# Patient Record
Sex: FEMALE | Race: WHITE | NOT HISPANIC OR LATINO | URBAN - METROPOLITAN AREA
[De-identification: names, ages, dates, MRNs, and addresses within clinical notes are randomized per-mention and may not be internally consistent; named-entity substitution may affect disease eponyms.]

---

## 2023-01-26 ENCOUNTER — EMERGENCY (EMERGENCY)
Facility: HOSPITAL | Age: 28
LOS: 1 days | Discharge: HOME | End: 2023-01-26
Admitting: EMERGENCY MEDICINE
Payer: COMMERCIAL

## 2023-01-26 VITALS
RESPIRATION RATE: 16 BRPM | HEIGHT: 66 IN | SYSTOLIC BLOOD PRESSURE: 110 MMHG | HEART RATE: 70 BPM | WEIGHT: 145.06 LBS | DIASTOLIC BLOOD PRESSURE: 78 MMHG | OXYGEN SATURATION: 97 % | TEMPERATURE: 98 F

## 2023-01-26 PROCEDURE — 99283 EMERGENCY DEPT VISIT LOW MDM: CPT

## 2023-01-26 PROCEDURE — 99284 EMERGENCY DEPT VISIT MOD MDM: CPT

## 2023-01-26 PROCEDURE — 96372 THER/PROPH/DIAG INJ SC/IM: CPT

## 2023-01-26 RX ORDER — CYCLOBENZAPRINE HYDROCHLORIDE 10 MG/1
1 TABLET, FILM COATED ORAL
Qty: 15 | Refills: 0
Start: 2023-01-26 | End: 2023-01-30

## 2023-01-26 RX ORDER — KETOROLAC TROMETHAMINE 30 MG/ML
30 SYRINGE (ML) INJECTION ONCE
Refills: 0 | Status: DISCONTINUED | OUTPATIENT
Start: 2023-01-26 | End: 2023-01-26

## 2023-01-26 RX ORDER — DICLOFENAC SODIUM 75 MG/1
1 TABLET, DELAYED RELEASE ORAL
Qty: 15 | Refills: 0
Start: 2023-01-26 | End: 2023-01-30

## 2023-01-26 RX ORDER — LIDOCAINE 4 G/100G
1 CREAM TOPICAL
Qty: 7 | Refills: 0
Start: 2023-01-26 | End: 2023-02-01

## 2023-01-26 RX ORDER — LIDOCAINE 4 G/100G
1 CREAM TOPICAL ONCE
Refills: 0 | Status: COMPLETED | OUTPATIENT
Start: 2023-01-26 | End: 2023-01-26

## 2023-01-26 RX ORDER — CYCLOBENZAPRINE HYDROCHLORIDE 10 MG/1
10 TABLET, FILM COATED ORAL ONCE
Refills: 0 | Status: COMPLETED | OUTPATIENT
Start: 2023-01-26 | End: 2023-01-26

## 2023-01-26 RX ADMIN — CYCLOBENZAPRINE HYDROCHLORIDE 10 MILLIGRAM(S): 10 TABLET, FILM COATED ORAL at 15:38

## 2023-01-26 RX ADMIN — Medication 30 MILLIGRAM(S): at 16:52

## 2023-01-26 RX ADMIN — Medication 30 MILLIGRAM(S): at 15:38

## 2023-01-26 RX ADMIN — LIDOCAINE 1 PATCH: 4 CREAM TOPICAL at 15:38

## 2023-01-26 NOTE — ED ADULT TRIAGE NOTE - CHIEF COMPLAINT QUOTE
Pt BIBEMS from home for evaluation of lower back pain that began 3 days ago. Pt unsure of injury. Pt states, "I did Jiujitsu the night before and then the next day my back started hurting." Denies saddle anesthesia, bowl or bladder dysfunction, urinary sx.

## 2023-01-26 NOTE — ED PROVIDER NOTE - NSFOLLOWUPINSTRUCTIONS_ED_ALL_ED_FT
Back Pain  rest, avoid heavy lifting, pushing, pulling, apply heating pad, take medications as discussed, follow up with your pmd  Back pain is very common in adults. The cause of back pain is rarely dangerous and the pain often gets better over time. The cause of your back pain may not be known and may include strain of muscles or ligaments, degeneration of the spinal disks, or arthritis. Occasionally the pain may radiate down your leg(s). Over-the-counter medicines to reduce pain and inflammation are often the most helpful. Stretching and remaining active frequently helps the healing process.     SEEK IMMEDIATE MEDICAL CARE IF YOU HAVE ANY OF THE FOLLOWING SYMPTOMS: bowel or bladder control problems, unusual weakness or numbness in your arms or legs, nausea or vomiting, abdominal pain, fever, dizziness/lightheadedness.

## 2023-01-26 NOTE — ED ADULT NURSE REASSESSMENT NOTE - NS ED NURSE REASSESS COMMENT FT1
Patient back pain improved s/p meds, no new symptom complaint, vital signs stable. Discharged to home in stable condition.

## 2023-01-26 NOTE — ED ADULT NURSE REASSESSMENT NOTE - NS ED NURSE REASSESS COMMENT FT1
Patient aoX3, anxious, c/o of lower back pain with difficulty and pain on movement, no numbness/tingling. Vital signs stable.  Adminsitered Lidocaine patch, Flexeril PO, Toradol 30mg IM.  Disposition pending.

## 2023-01-26 NOTE — ED PROVIDER NOTE - MUSCULOSKELETAL, MLM
no rash, no lesions, no spinal tend, FROM, + para lumbar tend b/l, LE w/FROM b/l, muscle strength 5/5 b/l, good resistance b/l, normal gait

## 2023-01-26 NOTE — ED PROVIDER NOTE - PATIENT PORTAL LINK FT
You can access the FollowMyHealth Patient Portal offered by [facility] by registering at the following website: [email or web address]. By joining [employer]’s FollowMyHealth portal, you will also be able to view your health information using other applications (apps) compatible with our system.

## 2023-01-26 NOTE — ED ADULT NURSE NOTE - OBJECTIVE STATEMENT
Patient c/o of lower mid-back pain, states did Jujitsu 3 days ago Monday and noted back pain started the next day, worse today , even getting up from chair causes severe pain, no numbness/tingling, no neck tenderness, no neuro or motor deficits.  States does not recall getting injured.  Took Ibuprofen last night, still with pain.

## 2023-01-26 NOTE — ED PROVIDER NOTE - CLINICAL SUMMARY MEDICAL DECISION MAKING FREE TEXT BOX
pt c/o lbp after doing [place], no blunt trauma or fall, no saddle anesthesia, no bowel or bladder incontinence, no concern for cord compression or cauda equina, no concern for fx, suspect to msk / spasm, improved w/meds in ed, will dc w/same regimen, to rest and f/u w/pmd or spine, pt understands and agrees w/plan, strict return precautions given

## 2023-01-26 NOTE — ED PROVIDER NOTE - OBJECTIVE STATEMENT
The pt is a 28 y/o F, who presents to ED c/o LBP today -- did sreedhar few d ago. Pt states pain is to entire lower back, 7/10, constant and dull, aggravated w/mov, has not taken any pain meds. Denies blunt trauma, fall, numbness or tingling to toes, loss of bowel or bladder control, fevers, chills.
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
## 2023-01-28 DIAGNOSIS — M54.50 LOW BACK PAIN, UNSPECIFIED: ICD-10-CM

## 2023-11-20 NOTE — ED ADULT NURSE NOTE - NS ED NURSE RECORD ANOTHER VITAL SIGN
Yes Rhomboid Transposition Flap Text: The defect edges were debeveled with a #15 scalpel blade. Given the location of the defect and the proximity to free margins a rhomboid transposition flap was deemed most appropriate. Using a sterile surgical marker, an appropriate rhomboid flap was drawn incorporating the defect. The area thus outlined was incised deep to adipose tissue with a #15 scalpel blade. The skin margins were undermined to an appropriate distance in all directions utilizing iris scissors. Following this, the designed flap was carried over into the primary defect and sutured into place.